# Patient Record
Sex: MALE | Race: WHITE | NOT HISPANIC OR LATINO | Employment: UNEMPLOYED | URBAN - METROPOLITAN AREA
[De-identification: names, ages, dates, MRNs, and addresses within clinical notes are randomized per-mention and may not be internally consistent; named-entity substitution may affect disease eponyms.]

---

## 2019-12-12 ENCOUNTER — HOSPITAL ENCOUNTER (EMERGENCY)
Facility: HOSPITAL | Age: 64
Discharge: HOME | End: 2019-12-12
Attending: FAMILY MEDICINE
Payer: COMMERCIAL

## 2019-12-12 VITALS
TEMPERATURE: 98 F | SYSTOLIC BLOOD PRESSURE: 159 MMHG | RESPIRATION RATE: 20 BRPM | HEART RATE: 90 BPM | HEIGHT: 70 IN | OXYGEN SATURATION: 99 % | DIASTOLIC BLOOD PRESSURE: 88 MMHG | BODY MASS INDEX: 21.19 KG/M2 | WEIGHT: 148 LBS

## 2019-12-12 DIAGNOSIS — M25.551 RIGHT HIP PAIN: Primary | ICD-10-CM

## 2019-12-12 PROCEDURE — 73502 X-RAY EXAM HIP UNI 2-3 VIEWS: CPT | Mod: 26,RT,, | Performed by: RADIOLOGY

## 2019-12-12 PROCEDURE — 73502 XR PELVIS 3 VIEW INC HIP 2 VIEW RIGHT: ICD-10-PCS | Mod: 26,RT,, | Performed by: RADIOLOGY

## 2019-12-12 PROCEDURE — 73502 X-RAY EXAM HIP UNI 2-3 VIEWS: CPT | Mod: TC,FY,RT

## 2019-12-12 PROCEDURE — 99284 EMERGENCY DEPT VISIT MOD MDM: CPT | Mod: 25

## 2019-12-12 RX ORDER — KETOROLAC TROMETHAMINE 10 MG/1
10 TABLET, FILM COATED ORAL EVERY 6 HOURS PRN
Qty: 18 TABLET | Refills: 0 | Status: SHIPPED | OUTPATIENT
Start: 2019-12-12 | End: 2019-12-17

## 2019-12-12 RX ORDER — CYCLOBENZAPRINE HCL 10 MG
10 TABLET ORAL 3 TIMES DAILY PRN
Qty: 12 TABLET | Refills: 0 | Status: SHIPPED | OUTPATIENT
Start: 2019-12-12 | End: 2019-12-17

## 2019-12-12 NOTE — ED PROVIDER NOTES
Encounter Date: 12/12/2019       History     Chief Complaint   Patient presents with    Hip Pain     Patient reports he was sitting on  the floor last night and started having right hip pain radiating down his leg, denies any injury.     Chidi Hernández is a 64 y.o male with no sign PMHx. He presents to ED with right hip pain    Patient reports that he was sitting on the floor watching TV. When he attempted to get up he felt severe pain in right hip. He was unable to get up from floor without assistance    Patient is ambulatory with limp. He rates pain to right hip at 10/10 at worse. He reports sharp, stabbing pain that radiates to inner thigh at times. He has increased pain with ROM and wt bearing    Right hip with no swelling or erythema. No obvious dislocation or deformity. Lower extremities as prescribed    No fever, chills, weakness,     He took an Aleve this morning with no relief in symptoms        Review of patient's allergies indicates:   Allergen Reactions    Pcn [penicillins]      History reviewed. No pertinent past medical history.  Past Surgical History:   Procedure Laterality Date    HERNIA REPAIR       History reviewed. No pertinent family history.  Social History     Tobacco Use    Smoking status: Current Every Day Smoker   Substance Use Topics    Alcohol use: Yes    Drug use: Never     Review of Systems   Constitutional: Negative.  Negative for fever.   HENT: Negative.  Negative for sore throat.    Eyes: Negative.    Respiratory: Negative.  Negative for shortness of breath.    Cardiovascular: Negative.  Negative for chest pain.   Gastrointestinal: Negative.  Negative for nausea.   Genitourinary: Negative for dysuria.   Musculoskeletal: Positive for arthralgias (right hip). Negative for back pain.   Skin: Negative for rash.   Allergic/Immunologic: Negative.    Neurological: Negative.  Negative for weakness.   Hematological: Negative.  Does not bruise/bleed easily.   Psychiatric/Behavioral:  Negative.    All other systems reviewed and are negative.      Physical Exam     Initial Vitals [12/12/19 0930]   BP Pulse Resp Temp SpO2   (!) 159/88 90 20 98.3 °F (36.8 °C) 99 %      MAP       --         Physical Exam    Nursing note and vitals reviewed.  Constitutional: He appears well-developed and well-nourished.   HENT:   Head: Normocephalic.   Eyes: Conjunctivae are normal.   Neck: Normal range of motion. Neck supple.   Cardiovascular: Normal rate.   Pulmonary/Chest: Breath sounds normal.   Musculoskeletal: Normal range of motion.        Right hip: He exhibits tenderness and bony tenderness. He exhibits normal range of motion, normal strength, no swelling, no crepitus, no deformity and no laceration.   Neurological: He is alert and oriented to person, place, and time. GCS score is 15. GCS eye subscore is 4. GCS verbal subscore is 5. GCS motor subscore is 6.   Skin: Skin is warm. Capillary refill takes less than 2 seconds.   Psychiatric: He has a normal mood and affect. His behavior is normal. Judgment and thought content normal.         ED Course   Procedures  Labs Reviewed - No data to display       Imaging Results    None          Medical Decision Making:   Initial Assessment:   Patient with right hip pain    Patient reports that he was sitting on the floor watching TV. When he attempted to get up he felt severe pain in right hip. He was unable to get up from floor without assistance    Patient is ambulatory with limp. He rates pain to right hip at 10/10 at worse. He reports sharp, stabbing pain that radiates to inner thigh at times. He has increased pain with ROM and wt bearing    Right hip with no swelling or erythema. No obvious dislocation or deformity. Lower extremities as prescribed    No fever, chills, weakness,     He took an Aleve this morning with no relief in symptoms    Differential Diagnosis:   Bone injury, strain, degenerative changes  Clinical Tests:   Radiological Study: Ordered  ED  Management:   XR right hip with no fracture or dislocation    Patient to follow-up with PCP for further evaluation of 1.5 cm lytic lesion of the femoral neck may represent a bone cyst.    Discussed physical exam findings with patient  No acute emergent medical condition identified at this time to warrant further testing/diagnostics  At this time, I believe the patient is clinically stable for discharge.   Patient to follow up with PCP in 1-2 days.  The patient acknowledges that close follow up with a MD is required after all ER visits  Pt given instructions; take all medications prescribed in the ER as directed.   Patient agrees to comply with all instruction and direction given in the ER  Pt agrees to return to ER if any symptoms reoccur                                          Clinical Impression:       ICD-10-CM ICD-9-CM   1. Right hip pain M25.551 719.45                             Valencia Ernst NP  12/12/19 1141

## 2019-12-12 NOTE — ED TRIAGE NOTES
Patient reports he was sitting on  the floor last night and started having right hip pain radiating down his leg, denies any injury.

## 2019-12-12 NOTE — DISCHARGE INSTRUCTIONS
Take meds as prescribed    Follow-up with PCP for further evaluation of lesion right femoral neck as discussed    Rest     Ice or heat

## 2021-11-15 ENCOUNTER — HOSPITAL ENCOUNTER (OUTPATIENT)
Dept: RADIOLOGY | Facility: HOSPITAL | Age: 66
Discharge: HOME | End: 2021-11-15
Attending: CHIROPRACTOR
Payer: MEDICARE

## 2021-11-15 DIAGNOSIS — M54.50 LOWER BACK PAIN: ICD-10-CM

## 2021-11-15 PROCEDURE — 72100 X-RAY EXAM L-S SPINE 2/3 VWS: CPT | Mod: TC,PN

## 2021-11-15 PROCEDURE — 72100 X-RAY EXAM L-S SPINE 2/3 VWS: CPT | Mod: 26,,, | Performed by: RADIOLOGY

## 2021-11-15 PROCEDURE — 72100 XR LUMBAR SPINE AP AND LATERAL: ICD-10-PCS | Mod: 26,,, | Performed by: RADIOLOGY
